# Patient Record
(demographics unavailable — no encounter records)

---

## 2025-02-04 NOTE — HISTORY OF PRESENT ILLNESS
[Home] : at home, [unfilled] , at the time of the visit. [Medical Office: ([facility])___] : at the medical office located in  [Verbal consent obtained from patient] : the patient, [unfilled] [FreeTextEntry1] : Vitamin D [de-identified] : Patient encounter today for re-evaluation of Vitamin D.  States he has been doing well.  Denies any CP, SOB or diff breathing.  No recent fever, chills, cough or cold type symptoms.  Has no other complaints at this time.

## 2025-02-04 NOTE — PLAN
[FreeTextEntry1] : Labs reviewed with patient during this encounter.  Patient to continue with present medications - all medications reconciled/reviewed during this visit and listed above Patient to start Vitamin therapy as discussed during visit Increase fluid intake..  RTO for repeat labs in 12 months.  RTO in 12 months for re-evaluation.   At least 25 minutes was spent with patient via video conference reviewing findings/results during this visit. Ample time was provided to answer any questions or address concerns to the patients satisfaction..  Patient was advised that this audiovisual encounter constitutes a billable visit, and that the visit will be billed on the same terms and conditions as an in-office, face-to-face visit. Patient was further advised they may be responsible for any co-payment, co-insurance, or other self-payment they would normally pay for an office visit, unless such self-payment was waived by their insurance carrier.

## 2025-07-22 NOTE — PHYSICAL EXAM
[No Acute Distress] : no acute distress [Well Nourished] : well nourished [Well Developed] : well developed [Well-Appearing] : well-appearing [Normal Sclera/Conjunctiva] : normal sclera/conjunctiva [PERRL] : pupils equal round and reactive to light [EOMI] : extraocular movements intact [Normal Outer Ear/Nose] : the outer ears and nose were normal in appearance [Normal Oropharynx] : the oropharynx was normal [No JVD] : no jugular venous distention [No Lymphadenopathy] : no lymphadenopathy [Supple] : supple [Thyroid Normal, No Nodules] : the thyroid was normal and there were no nodules present [No Respiratory Distress] : no respiratory distress  [No Accessory Muscle Use] : no accessory muscle use [Clear to Auscultation] : lungs were clear to auscultation bilaterally [Normal Rate] : normal rate  [Regular Rhythm] : with a regular rhythm [Normal S1, S2] : normal S1 and S2 [No Carotid Bruits] : no carotid bruits [No Abdominal Bruit] : a ~M bruit was not heard ~T in the abdomen [No Varicosities] : no varicosities [Pedal Pulses Present] : the pedal pulses are present [No Edema] : there was no peripheral edema [No Palpable Aorta] : no palpable aorta [No Extremity Clubbing/Cyanosis] : no extremity clubbing/cyanosis [Soft] : abdomen soft [Non Tender] : non-tender [Non-distended] : non-distended [No Masses] : no abdominal mass palpated [No HSM] : no HSM [Normal Bowel Sounds] : normal bowel sounds [Normal Posterior Cervical Nodes] : no posterior cervical lymphadenopathy [Normal Anterior Cervical Nodes] : no anterior cervical lymphadenopathy [No CVA Tenderness] : no CVA  tenderness [No Spinal Tenderness] : no spinal tenderness [No Joint Swelling] : no joint swelling [Grossly Normal Strength/Tone] : grossly normal strength/tone [No Rash] : no rash [Coordination Grossly Intact] : coordination grossly intact [No Focal Deficits] : no focal deficits [Normal Gait] : normal gait [Deep Tendon Reflexes (DTR)] : deep tendon reflexes were 2+ and symmetric [Normal Affect] : the affect was normal [Normal Insight/Judgement] : insight and judgment were intact [de-identified] : systolic murmur

## 2025-07-22 NOTE — PLAN
[FreeTextEntry1] : Boostrix vaccine administered in left deltoid intramuscularly - patient tolerated well.  Recommend Motrin every 6 hours for the next 24 hours and warm compress to vaccination site.  Patient to continue with present medications - all medications reconciled/reviewed during this visit.   Increase fluid intake. RTO in 7-10 days for re-evaluation. At least 20 minutes was spent with patient face to face examining and reviewing findings/results during this visit. Ample time was provided to answer any questions or address concerns to the patients satisfaction.   I, Saranya Mott, attest that this documentation has been prepared under the direction and in the presence of Provider Farhad Erazo DNP  The documentation recorded by the scribe, in my presence, accurately reflects the service I personally performed, and the decisions made by me with my edits as appropriate. Farhad Erazo DNP

## 2025-07-22 NOTE — HISTORY OF PRESENT ILLNESS
[FreeTextEntry1] : TDAP vaccine [de-identified] : Patient encounter today for administration of TDAP vaccine. States he has been doing well.  Denies any CP, SOB or diff breathing.  No recent fever, chills, cough or cold type symptoms.  Has no other complaints at this time.